# Patient Record
(demographics unavailable — no encounter records)

---

## 2025-05-06 NOTE — ASSESSMENT
[FreeTextEntry1] : Rickey Kat is a 69 -year- old gentleman who underwent removal and replacement of a spinal cord stimulator battery pulse generator, complex programming of the battery pulse generator which is a [employer] system on 04/17/2025.  His incision is healing well without complication.   The patient may shower and use soap and water on the incision.  He to rinse and pat the incision dry gently and leave it uncovered.  The patient is to call if he notices any redness, swelling, or drainage.  The patient met with the [employer] spinal cord stimulator representative and had education about the stimulator.  The stimulator underwent complex programming making changes to the millivolt frequency and pulse width resulting in good coverage of the patient's pain.  The patient is to follow up with the [employer] representative and may return to this office on an as needed basis.

## 2025-05-06 NOTE — REASON FOR VISIT
[Family Member] : family member [de-identified] : 1.  Removal and replacement of spinal cord stimulator attery pulse generator. 2.  Complex programming of battery pulse genertor. 3. [employer] system. [de-identified] : 04/17/2025 [de-identified] : 19 [de-identified] : 2 [de-identified] : The patient stated that he is having back and leg pain.  His incisional pain has improved; however, he has pain in his back where the leads are.  The patient is here for a wound check and to meet with the [employer] representative for the patient to have education about the stimulator and for the stimulator to undergo complex programming.

## 2025-05-06 NOTE — PHYSICAL EXAM
[General Appearance - Alert] : alert [General Appearance - In No Acute Distress] : in no acute distress [General Appearance - Well Nourished] : well nourished [General Appearance - Well-Appearing] : healthy appearing [] : normal voice and communication [Clean] : clean [Dry] : dry [Healing Well] : healing well [Intact] : intact [No Drainage] : without drainage [Normal Skin] : normal [Normal Skin Turgor] : skin turgor was normal [Oriented To Time, Place, And Person] : oriented to person, place, and time [Impaired Insight] : insight and judgment were intact [Affect] : the affect was normal [Mood] : the mood was normal [Memory Recent] : recent memory was not impaired [Memory Remote] : remote memory was not impaired [Person] : oriented to person [Place] : oriented to place [Time] : oriented to time [Short Term Intact] : short term memory intact [Remote Intact] : remote memory intact [Span Intact] : the attention span was normal [Concentration Intact] : normal concentrating ability [Fluency] : fluency intact [Comprehension] : comprehension intact [Current Events] : adequate knowledge of current events [Past History] : adequate knowledge of personal past history [Vocabulary] : adequate range of vocabulary [Cranial Nerves Optic (II)] : visual acuity intact bilaterally,  pupils equal round and reactive to light [Cranial Nerves Oculomotor (III)] : extraocular motion intact [Cranial Nerves Trigeminal (V)] : facial sensation intact symmetrically [Cranial Nerves Facial (VII)] : face symmetrical [Cranial Nerves Vestibulocochlear (VIII)] : hearing was intact bilaterally [Cranial Nerves Glossopharyngeal (IX)] : tongue and palate midline [Cranial Nerves Accessory (XI - Cranial And Spinal)] : head turning and shoulder shrug symmetric [Cranial Nerves Hypoglossal (XII)] : there was no tongue deviation with protrusion [Motor Tone] : muscle tone was normal in all four extremities [Motor Strength] : muscle strength was normal in all four extremities [No Muscle Atrophy] : normal bulk in all four extremities [5] : T1 abductor digiti minimi 5/5 [3] : L2 Iliopsoas 3/5 [4] : S1 flexor hallucis longus 4/5 [Sensation Tactile Decrease] : light touch was intact [Limited Balance] : the patient's balance was impaired [1+] : Ankle jerk right 1+ [0] : Ankle jerk left 0 [Erythema] : not erythematous [Warm] : not warm [FreeTextEntry1] : Left buttock incision [Past-pointing] : there was no past-pointing [Tremor] : no tremor present [FreeTextEntry8] : slightly uneven gait due to left leg being weaker than right.  Uses a cane for stability.

## 2025-05-06 NOTE — HISTORY OF PRESENT ILLNESS
[FreeTextEntry1] : LEONILA HAMPTON is a 69- year -old gentleman who had a [employer] spinal cord stimulator placed for chronic pain.  He had done well with this and had been charging it regularly.  Beginning in January, it would no longer charge, and he started to experience increased pain.  He underwent replacement of a [employer] battery on 04/17/2025.

## 2025-05-06 NOTE — REASON FOR VISIT
[Family Member] : family member [de-identified] : 1.  Removal and replacement of spinal cord stimulator attery pulse generator. 2.  Complex programming of battery pulse genertor. 3. [employer] system. [de-identified] : 04/17/2025 [de-identified] : 19 [de-identified] : 2 [de-identified] : The patient stated that he is having back and leg pain.  His incisional pain has improved; however, he has pain in his back where the leads are.  The patient is here for a wound check and to meet with the [employer] representative for the patient to have education about the stimulator and for the stimulator to undergo complex programming.